# Patient Record
Sex: FEMALE | Race: WHITE
[De-identification: names, ages, dates, MRNs, and addresses within clinical notes are randomized per-mention and may not be internally consistent; named-entity substitution may affect disease eponyms.]

---

## 2017-06-06 ENCOUNTER — HOSPITAL ENCOUNTER (EMERGENCY)
Dept: HOSPITAL 62 - ER | Age: 37
Discharge: HOME | End: 2017-06-06
Payer: SELF-PAY

## 2017-06-06 VITALS — SYSTOLIC BLOOD PRESSURE: 131 MMHG | DIASTOLIC BLOOD PRESSURE: 83 MMHG

## 2017-06-06 DIAGNOSIS — R51: ICD-10-CM

## 2017-06-06 DIAGNOSIS — N39.0: Primary | ICD-10-CM

## 2017-06-06 DIAGNOSIS — F17.200: ICD-10-CM

## 2017-06-06 DIAGNOSIS — R10.2: ICD-10-CM

## 2017-06-06 DIAGNOSIS — R11.0: ICD-10-CM

## 2017-06-06 DIAGNOSIS — R50.9: ICD-10-CM

## 2017-06-06 LAB
APPEARANCE UR: (no result)
BILIRUB UR QL STRIP: NEGATIVE
GLUCOSE UR STRIP-MCNC: NEGATIVE MG/DL
KETONES UR STRIP-MCNC: NEGATIVE MG/DL
NITRITE UR QL STRIP: POSITIVE
PH UR STRIP: 6 [PH] (ref 5–9)
PROT UR STRIP-MCNC: 30 MG/DL
SP GR UR STRIP: 1.01
UROBILINOGEN UR-MCNC: 2 MG/DL (ref ?–2)

## 2017-06-06 PROCEDURE — 81001 URINALYSIS AUTO W/SCOPE: CPT

## 2017-06-06 PROCEDURE — 99283 EMERGENCY DEPT VISIT LOW MDM: CPT

## 2017-06-06 PROCEDURE — 87880 STREP A ASSAY W/OPTIC: CPT

## 2017-06-06 PROCEDURE — 81025 URINE PREGNANCY TEST: CPT

## 2017-06-06 PROCEDURE — S0119 ONDANSETRON 4 MG: HCPCS

## 2017-06-06 PROCEDURE — 87804 INFLUENZA ASSAY W/OPTIC: CPT

## 2017-06-06 PROCEDURE — 87070 CULTURE OTHR SPECIMN AEROBIC: CPT

## 2017-06-06 NOTE — ER DOCUMENT REPORT
Addendum entered and electronically signed by SHU HERRON PA-C  17 11:53

: 








Course





- Re-evaluation


Re-evalutation: 





17 11:52


[facility] called with a question about the Rx of Cipro for a UTI that 

was prescribed yesterday.  The script was written for 3 days, but there were 

directions for 5 days.  Approved change to a 5 day prescription of Cipro 500mg 

PO BID #[id number].  





- Vital Signs


Vital signs: 


 











Temp Pulse Resp BP Pulse Ox


 


 97.4 F   81   17   131/83 H  98 


 


 17 22:28  17 22:28  17 22:28  17 20:26  17 22:38














- Laboratory


Laboratory results interpreted by me: 


 











  17





  20:58


 


Urine Protein  30 H


 


Urine Nitrite  POSITIVE H


 


Urine Urobilinogen  2.0 H


 


Ur Leukocyte Esterase  SMALL H


 


Urine Ascorbic Acid  20 H














Original Note:








HPI





- HPI


Patient complains to provider of: pelvic pain


Onset: Other - 


Onset/Duration: Sudden


Quality of pain: Achy


Pain Level: 2


Context: 


currently on her period, no vaginal d/c


Associated Symptoms: Chills, Fever, Headache, Nausea.  denies: Diarrhea


Exacerbated by: Denies


Relieved by: Denies


Similar symptoms previously: No


Recently seen / treated by doctor: No





- CARDIOVASCULAR


Cardiovascular: DENIES: Chest pain





- REPRODUCTIVE


LMP: 17


Reproductive: DENIES: Pregnant:





- DERM


Skin Color: Normal, Pink





Past Medical History





- Social History


Smoking Status: Current Every Day Smoker


Chew tobacco use (# tins/day): No


Frequency of alcohol use: None


Drug Abuse: None


Family History: Reviewed & Not Pertinent


Patient has suicidal ideation: No


Patient has homicidal ideation: No


Renal/ Medical History: Denies: Hx Peritoneal Dialysis


Skin Medical History: Reports Hx MRSA


Past Surgical History: Reports: Hx  Section - x4





- Immunizations


Hx Diphtheria, Pertussis, Tetanus Vaccination: Yes





Vertical Provider Document





- CONSTITUTIONAL


Agree With Documented VS: Yes


Exam Limitations: No Limitations


General Appearance: WD/WN, No Apparent Distress





- INFECTION CONTROL


TRAVEL OUTSIDE OF THE [place] IN LAST 30 DAYS: No





- HEENT


HEENT: Atraumatic, Normal ENT Exam, Normocephalic, PERRLA


Notes: 


Uvula midline.  Airway patent. No evidence of tonsillar enlargement, 

peritonsillar abscess, retropharyngeal abscess.





- NECK


Neck: Normal Inspection.  negative: Lymphadenopathy-Left, Lymphadenopathy-Right





- RESPIRATORY


Respiratory: Breath Sounds Normal, No Respiratory Distress, Chest Non-Tender


O2 Sat by Pulse Oximetry: 98





- CARDIOVASCULAR


Cardiovascular: Regular Rate, Regular Rhythm, No Murmur


Pulses: Normal: Radial, Dorsalis pedis





- GI/ABDOMEN


Gastrointestinal: Abdomen Soft, Abdomen Non-Tender, No Organomegaly, Normal 

Bowel Sounds





- REPRODUCTIVE


Female Genitalia: Normal Inspection.  negative: CMT, Adnexal Pain-Right, 

Adnexal Pain-Left





- BACK


Back: Normal Inspection.  negative: CVA Tenderness-Right, CVA Tenderness-Left





- MUSCULOSKELETAL/EXTREMETIES


Musculoskeletal/Extremeties: MAEW, FROM, Non-Tender, No Edema.  negative: 

Eccymosis





- NEURO


Level of Consciousness: Awake, Alert, Appropriate


Motor/Sensory: No Motor Deficit, No Sensory Deficit





- DERM


Integumentary: Warm, Dry, No Rash





Course





- Re-evaluation


Re-evalutation: 





17 22:38


37-year-old female who is hemodynamic stable, no acute distress and afebrile.  

Urinalysis shows evidence of urinary tract infection.  Discharge patient home 

on p.o. antibiotics instruction to follow-up with primary care





- Vital Signs


Vital signs: 


 











Temp Pulse Resp BP Pulse Ox


 


 98.6 F   88   16   131/83 H  98 


 


 17 20:26  17 20:26  17 20:26  17 20:26  17 20:26














- Laboratory


Laboratory results interpreted by me: 


 











  17





  20:58


 


Urine Protein  30 H


 


Urine Nitrite  POSITIVE H


 


Urine Urobilinogen  2.0 H


 


Ur Leukocyte Esterase  SMALL H


 


Urine Ascorbic Acid  20 H














Discharge





- Discharge


Clinical Impression: 


 UTI (urinary tract infection)





Condition: Good


Disposition: HOME, SELF-CARE


Additional Instructions: 


URINARY TRACT INFECTION:





     Your evaluation indicates that you have a urinary tract infection. This is 

due to germs growing in the bladder.  This is a common problem.


     This infection usually responds quickly to antibiotics.  Your antibiotic 

should be taken exactly as prescribed.  Drink plenty of fluids -- three to four 

quarts a day.


     Occasionally, a bladder anesthetic will be prescribed to help stop the 

feeling of urgency until the antibiotic has a chance to clear the infection.  

This may cause your urine to be dark orange.


     Certain urine infections require a culture.  If the doctor obtained a 

culture, the results will be back in two days.  You should call to see if a 

change in treatment is needed.


     A repeat urinalysis after you finish treatment is often recommended.  The 

physician will let you know if further testing is required.


     Call the doctor if you develop fever, chills, flank pain, inability to 

urinate, or blood in the urine.








ANTIBIOTIC THERAPY:


     You have been given an antibiotic prescription.  It's important that you 

take all the medication, unless instructed otherwise by your physician.  

Failure to complete the entire course can result in relapse of your condition.


     Common side effects of antibiotics include nausea, intestinal cramping, or 

diarrhea.  Women may develop vaginal yeast infections, and babies can get yeast 

(thrush) in the mouth following the use of antibiotics.  Contact your physician 

if you develop significant side effects from this medication.


     Allergy to this antibiotic can result in hives, wheezing, faintness, or 

itching.  If symptoms of allergy occur, stop the medication and call the doctor.








CIPROFLOXACIN:


     You have been given an antibacterial agent, ciprofloxacin (Cipro).  This 

medicine is not related to the penicillins, sulfas, cephalosporins, or 

tetracyclines.  It is often given to patients who are allergic to these drugs.  

It has been chosen for you either because other drugs are not appropriate, or 

because of the nature of your problem.


     Cipro should not be taken with antacids, as these can decrease its 

effectiveness.  It can be taken without regard to meals.  CIPRO SHOULD NOT BE 

TAKEN BY CHILDREN, NURSING WOMEN, OR PREGNANT WOMEN.


     Although Cipro is usually well-tolerated, common side effects can include 

nausea and diarrhea.  Contact your doctor if you experience any unusual 

symptoms while on this medication, such as joint pain or swelling, shortness of 

breath, wheezing, faintness, or hives.








FOLLOW-UP CARE:


If you have been referred to a physician for follow-up care, call the physician

s office for an appointment as you were instructed or within the next two days.

  If you experience worsening or a significant change in your symptoms, notify 

the physician immediately or return to the Emergency Department at any time for 

re-evaluation.











Prescriptions: 


Ciprofloxacin HCl [Cipro 500 mg Tablet] 500 mg PO BID #6 tablet


Ondansetron [Zofran Odt 4 mg Tablet] 1 - 2 tab PO Q4H PRN #15 tab.rapdis


 PRN Reason: For Nausea/Vomiting


Forms:  Return to Work


Referrals: 


TESSA CASAS MD [ACTIVE STAFF] - Follow up as needed

## 2017-06-13 ENCOUNTER — HOSPITAL ENCOUNTER (EMERGENCY)
Dept: HOSPITAL 62 - ER | Age: 37
Discharge: HOME | End: 2017-06-13
Payer: SELF-PAY

## 2017-06-13 VITALS — DIASTOLIC BLOOD PRESSURE: 87 MMHG | SYSTOLIC BLOOD PRESSURE: 133 MMHG

## 2017-06-13 DIAGNOSIS — Z86.14: ICD-10-CM

## 2017-06-13 DIAGNOSIS — F17.210: ICD-10-CM

## 2017-06-13 DIAGNOSIS — Z87.440: ICD-10-CM

## 2017-06-13 DIAGNOSIS — R10.9: Primary | ICD-10-CM

## 2017-06-13 LAB
ALBUMIN SERPL-MCNC: 3.5 G/DL (ref 3.5–5)
ALP SERPL-CCNC: 74 U/L (ref 38–126)
ALT SERPL-CCNC: 27 U/L (ref 9–52)
ANION GAP SERPL CALC-SCNC: 11 MMOL/L (ref 5–19)
APPEARANCE UR: (no result)
AST SERPL-CCNC: 22 U/L (ref 14–36)
BASOPHILS # BLD AUTO: 0 10^3/UL (ref 0–0.2)
BASOPHILS NFR BLD AUTO: 0.5 % (ref 0–2)
BILIRUB DIRECT SERPL-MCNC: 0.2 MG/DL (ref 0–0.4)
BILIRUB SERPL-MCNC: 0.3 MG/DL (ref 0.2–1.3)
BILIRUB UR QL STRIP: NEGATIVE
BUN SERPL-MCNC: 11 MG/DL (ref 7–20)
CALCIUM: 9.2 MG/DL (ref 8.4–10.2)
CHLORIDE SERPL-SCNC: 106 MMOL/L (ref 98–107)
CO2 SERPL-SCNC: 24 MMOL/L (ref 22–30)
CREAT SERPL-MCNC: 0.79 MG/DL (ref 0.52–1.25)
EOSINOPHIL # BLD AUTO: 0.1 10^3/UL (ref 0–0.6)
EOSINOPHIL NFR BLD AUTO: 1.5 % (ref 0–6)
ERYTHROCYTE [DISTWIDTH] IN BLOOD BY AUTOMATED COUNT: 17.7 % (ref 11.5–14)
GLUCOSE SERPL-MCNC: 95 MG/DL (ref 75–110)
GLUCOSE UR STRIP-MCNC: NEGATIVE MG/DL
HCT VFR BLD CALC: 34.1 % (ref 36–47)
HGB BLD-MCNC: 11 G/DL (ref 12–15.5)
HGB HCT DIFFERENCE: -1.1
KETONES UR STRIP-MCNC: NEGATIVE MG/DL
LYMPHOCYTES # BLD AUTO: 1.4 10^3/UL (ref 0.5–4.7)
LYMPHOCYTES NFR BLD AUTO: 19.7 % (ref 13–45)
MCH RBC QN AUTO: 27.3 PG (ref 27–33.4)
MCHC RBC AUTO-ENTMCNC: 32.3 G/DL (ref 32–36)
MCV RBC AUTO: 85 FL (ref 80–97)
MONOCYTES # BLD AUTO: 0.4 10^3/UL (ref 0.1–1.4)
MONOCYTES NFR BLD AUTO: 6.2 % (ref 3–13)
NEUTROPHILS # BLD AUTO: 5.2 10^3/UL (ref 1.7–8.2)
NEUTS SEG NFR BLD AUTO: 72.1 % (ref 42–78)
NITRITE UR QL STRIP: NEGATIVE
PH UR STRIP: 7 [PH] (ref 5–9)
POTASSIUM SERPL-SCNC: 4.4 MMOL/L (ref 3.6–5)
PROT SERPL-MCNC: 6.6 G/DL (ref 6.3–8.2)
PROT UR STRIP-MCNC: NEGATIVE MG/DL
RBC # BLD AUTO: 4.03 10^6/UL (ref 3.72–5.28)
SODIUM SERPL-SCNC: 140.7 MMOL/L (ref 137–145)
SP GR UR STRIP: 1.01
UROBILINOGEN UR-MCNC: NEGATIVE MG/DL (ref ?–2)
WBC # BLD AUTO: 7.2 10^3/UL (ref 4–10.5)

## 2017-06-13 PROCEDURE — 87086 URINE CULTURE/COLONY COUNT: CPT

## 2017-06-13 PROCEDURE — 81025 URINE PREGNANCY TEST: CPT

## 2017-06-13 PROCEDURE — 81001 URINALYSIS AUTO W/SCOPE: CPT

## 2017-06-13 PROCEDURE — 99284 EMERGENCY DEPT VISIT MOD MDM: CPT

## 2017-06-13 PROCEDURE — 80053 COMPREHEN METABOLIC PANEL: CPT

## 2017-06-13 PROCEDURE — 85025 COMPLETE CBC W/AUTO DIFF WBC: CPT

## 2017-06-13 PROCEDURE — 36415 COLL VENOUS BLD VENIPUNCTURE: CPT

## 2017-06-13 NOTE — ER DOCUMENT REPORT
ED General





- General


Chief Complaint: Flank Pain


Stated Complaint: LEFT SIDE FLANK PAIN


Time Seen by Provider: 17 06:48


Mode of Arrival: Ambulatory


Information source: Patient


Notes: 


A 7-year-old female who is diagnosed as a UTI approximately 1 week ago who has 

been on Cipro for 5 days presents with complaints of left flank pain.  Patient 

notes she had a fever then but the fever has since resolved.  Patient admits to 

some burning on urination


TRAVEL OUTSIDE OF THE [place] IN LAST 30 DAYS: No





- HPI


Onset: Last week


Onset/Duration: Persistent


Quality of pain: Achy


Severity: Mild


Pain Level: 1


Associated symptoms: Other


Exacerbated by: Other - Urination


Relieved by: Denies


Similar symptoms previously: Yes


Recently seen / treated by doctor: Yes





- Related Data


Allergies/Adverse Reactions: 


 





No Known Allergies Allergy (Unverified 17 06:48)


 











Past Medical History





- Social History


Smoking Status: Current Every Day Smoker


Cigarette use (# per day): Yes


Chew tobacco use (# tins/day): No


Smoking Education Provided: No


Family History: Reviewed & Not Pertinent


Renal/ Medical History: Denies: Hx Peritoneal Dialysis


Skin Medical History: Reports Hx MRSA


Past Surgical History: Reports: Hx  Section - x4





- Immunizations


Hx Diphtheria, Pertussis, Tetanus Vaccination: Yes





Review of Systems





- Review of Systems


Notes: 


REVIEW OF SYSTEMS:


CONSTITUTIONAL :  Denies fever,  chills, or sweats.  Denies recent illness.


EENT:   Denies eye, ear, throat, or mouth pain or symptoms.  Denies nasal or 

sinus congestion or discharge.  Denies throat, tongue, or mouth swelling or 

difficulty swallowing.


CARDIOVASCULAR:  Denies chest pain.  Denies palpitations or racing or irregular 

heart beat.  Denies ankle edema.


RESPIRATORY:  Denies cough, cold, or chest congestion.  Denies shortness of 

breath, difficulty breathing, or wheezing.


GASTROINTESTINAL:  Denies abdominal pain or distention.  Denies nausea, vomiting

, or diarrhea.  Denies blood in vomitus, stools, or per rectum.  Denies black, 

tarry stools.  Denies constipation. 


GENITOURINARY: burning on urination 


FEMALE  GENITOURINARY:  Denies vaginal bleeding, heavy or abnormal periods, 

irregular periods.  Denies vaginal discharge or odor. 


MUSCULOSKELETAL:  left flank tenderness 


SKIN:   Denies rash, lesions or sores.


HEMATOLOGIC :   Denies easy bruising or bleeding.


LYMPHATIC:  Denies swollen, enlarged glands.


NEUROLOGICAL:  Denies confusion or altered mental status.  Denies passing out 

or loss of consciousness.  Denies dizziness or lightheadedness.  Denies 

headache.  Denies weakness or paralysis or loss of use of either side.  Denies 

problems with gait or speech.  Denies sensory loss, numbness, or tingling.  

Denies seizures.








PHYSICAL EXAMINATION:





GENERAL: Well-appearing, well-nourished and in no acute distress.





HEAD: Atraumatic, normocephalic.





EYES: Pupils equal round and reactive to light, extraocular movements intact, 

conjunctiva are normal.





ENT: Nares patent, oropharynx clear without exudates.  Moist mucous membranes.





NECK: Normal range of motion, supple without lymphadenopathy





LUNGS: Breath sounds clear to auscultation bilaterally and equal.  No wheezes 

rales or rhonchi.





HEART: Regular rate and rhythm without murmurs





ABDOMEN: Soft, nontender, nondistended abdomen.  No guarding, no rebound.  No 

masses appreciated. left cva tenderness 





Female : deferred





Musculoskeletal: Normal range of motion, no pitting or edema.  No cyanosis.





NEUROLOGICAL: Cranial nerves grossly intact.  Normal speech, normal gait.  

Normal sensory, motor exams





PSYCH: Normal mood, normal affect.





SKIN: Warm, Dry, normal turgor, no rashes or lesions noted.





PSYCHIATRIC:  Denies anxiety or stress.  Denies depression, suicidal ideation, 

or homicidal ideation.





ALL OTHER SYSTEMS REVIEWED AND NEGATIVE.








Dictation was performed using Dragon voice recognition software





Physical Exam





- Vital signs


Vitals: 


 











Temp Pulse Resp BP Pulse Ox


 


 97.6 F   68   18   144/84 H  100 


 


 17 06:48  17 06:48  17 06:48  17 06:48  17 06:48














Course





- Re-evaluation


Re-evalutation: 


17 07:13


Lab work urinalysis pending patient was probably treated with Cipro 

appropriately which resolved her fevers however did not completely resolve her 

symptoms





17 08:20


Patient's lab work notes significant improvement.  I do not believe she has 

infection still, urine culture has been ordered but given the patient is 

afebrile and labwork notes significant improvement I believe she is stable for 

discharge with pain control and a work excuse








Very strict return precautions provided to the patient








After performing a Medical Screening Examination, I estimate there is LOW risk 

for ACUTE APPENDICITIS, BOWEL OBSTRUCTION, ACUTE CHOLECYSTITIS, PERFORATED 

DIVERTICULITIS, INCARCERATED HERNIA, PANCREATITIS, PELVIC INFLAMMATORY DISEASE, 

PERFORATED ULCER, ECTOPIC PREGNANCY, or TUBO-OVARIAN ABSCESS, thus I consider 

the discharge disposition reasonable. Also, there is no evidence or peritonitis

, sepsis, or toxicity. I have reevaluated this patient multiple times and no 

significant life threatening changes are noted. The patient and I have 

discussed the diagnosis and risks, and we agree with discharging home with 

close follow-up with the understanding that symptoms and presentations can 

change. We also discussed returning to the Emergency Department immediately if 

new or worsening symptoms occur. We have discussed the symptoms which are most 

concerning (e.g., bloody stool, fever, changing or worsening pain, vomiting) 

that necessitate immediate return.





- Vital Signs


Vital signs: 


 











Temp Pulse Resp BP Pulse Ox


 


 97.6 F   68   18   144/84 H  100 


 


 17 06:48  17 06:48  17 06:48  17 06:48  17 06:48














- Laboratory


Result Diagrams: 


 17 07:14





 17 07:14


Laboratory results interpreted by me: 


 











  17





  06:53 07:14


 


Hgb   11.0 L


 


Hct   34.1 L


 


RDW   17.7 H


 


Urine Blood  SMALL H 


 


Ur Leukocyte Esterase  TRACE H 














Discharge





- Discharge


Clinical Impression: 


 Left flank pain, History of UTI





Condition: Stable


Disposition: HOME, SELF-CARE


Instructions:  Abdominal Pain ([facility])


Additional Instructions: 


Follow up with your physician tomorrow for further care or return to the ED 

IMMEDIATELY if symptoms worsen or new concerns occur. If you cannot afford to 

follow up with your primary care physician a list of low cost clinics have been 

provided at the end of your discharge papers as well.


Prescriptions: 


Hydrocodone/Acetaminophen [Norco 5-325 mg Tablet] 1 tab PO Q6 #10 tablet


Forms:  Return to Work

## 2020-01-15 ENCOUNTER — HOSPITAL ENCOUNTER (EMERGENCY)
Dept: HOSPITAL 62 - ER | Age: 40
Discharge: LEFT BEFORE DISCHARGE | End: 2020-01-15
Payer: SELF-PAY

## 2020-01-15 VITALS — SYSTOLIC BLOOD PRESSURE: 128 MMHG | DIASTOLIC BLOOD PRESSURE: 75 MMHG

## 2020-01-15 DIAGNOSIS — Z53.21: Primary | ICD-10-CM

## 2020-01-15 PROCEDURE — 93005 ELECTROCARDIOGRAM TRACING: CPT

## 2020-01-15 PROCEDURE — 93010 ELECTROCARDIOGRAM REPORT: CPT

## 2020-08-28 ENCOUNTER — HOSPITAL ENCOUNTER (EMERGENCY)
Dept: HOSPITAL 62 - ER | Age: 40
LOS: 1 days | Discharge: LEFT BEFORE DISCHARGE | End: 2020-08-29
Payer: SELF-PAY

## 2020-08-28 VITALS — SYSTOLIC BLOOD PRESSURE: 134 MMHG | DIASTOLIC BLOOD PRESSURE: 99 MMHG

## 2020-08-28 DIAGNOSIS — T19.2XXA: Primary | ICD-10-CM

## 2020-08-28 DIAGNOSIS — F17.210: ICD-10-CM
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
## 2020-08-28 DIAGNOSIS — Z53.20: ICD-10-CM

## 2020-08-28 DIAGNOSIS — R10.2: ICD-10-CM

## 2020-08-28 LAB
ADD MANUAL DIFF: NO
ALBUMIN SERPL-MCNC: 3.8 G/DL (ref 3.5–5)
ALP SERPL-CCNC: 97 U/L (ref 38–126)
ANION GAP SERPL CALC-SCNC: 10 MMOL/L (ref 5–19)
AST SERPL-CCNC: 17 U/L (ref 14–36)
BASOPHILS # BLD AUTO: 0 10^3/UL (ref 0–0.2)
BASOPHILS NFR BLD AUTO: 0.4 % (ref 0–2)
BILIRUB DIRECT SERPL-MCNC: 0.2 MG/DL (ref 0–0.4)
BILIRUB SERPL-MCNC: 0.6 MG/DL (ref 0.2–1.3)
BUN SERPL-MCNC: 7 MG/DL (ref 7–20)
CALCIUM: 9.5 MG/DL (ref 8.4–10.2)
CHLORIDE SERPL-SCNC: 102 MMOL/L (ref 98–107)
CO2 SERPL-SCNC: 26 MMOL/L (ref 22–30)
EOSINOPHIL # BLD AUTO: 0.1 10^3/UL (ref 0–0.6)
EOSINOPHIL NFR BLD AUTO: 1.3 % (ref 0–6)
ERYTHROCYTE [DISTWIDTH] IN BLOOD BY AUTOMATED COUNT: 20.3 % (ref 11.5–14)
ETHANOL SERPL-MCNC: < 10 MG/DL
GLUCOSE SERPL-MCNC: 112 MG/DL (ref 75–110)
HCT VFR BLD CALC: 29.4 % (ref 36–47)
HGB BLD-MCNC: 9.8 G/DL (ref 12–15.5)
LYMPHOCYTES # BLD AUTO: 1.1 10^3/UL (ref 0.5–4.7)
LYMPHOCYTES NFR BLD AUTO: 13.6 % (ref 13–45)
MCH RBC QN AUTO: 24.3 PG (ref 27–33.4)
MCHC RBC AUTO-ENTMCNC: 33.3 G/DL (ref 32–36)
MCV RBC AUTO: 73 FL (ref 80–97)
MONOCYTES # BLD AUTO: 0.7 10^3/UL (ref 0.1–1.4)
MONOCYTES NFR BLD AUTO: 8.4 % (ref 3–13)
NEUTROPHILS # BLD AUTO: 6.4 10^3/UL (ref 1.7–8.2)
NEUTS SEG NFR BLD AUTO: 76.3 % (ref 42–78)
PLATELET # BLD: 300 10^3/UL (ref 150–450)
POTASSIUM SERPL-SCNC: 3.9 MMOL/L (ref 3.6–5)
PROT SERPL-MCNC: 6.7 G/DL (ref 6.3–8.2)
RBC # BLD AUTO: 4.03 10^6/UL (ref 3.72–5.28)
TOTAL CELLS COUNTED % (AUTO): 100 %
WBC # BLD AUTO: 8.4 10^3/UL (ref 4–10.5)

## 2020-08-28 PROCEDURE — 96360 HYDRATION IV INFUSION INIT: CPT

## 2020-08-28 PROCEDURE — 76856 US EXAM PELVIC COMPLETE: CPT

## 2020-08-28 PROCEDURE — 36415 COLL VENOUS BLD VENIPUNCTURE: CPT

## 2020-08-28 PROCEDURE — 93976 VASCULAR STUDY: CPT

## 2020-08-28 PROCEDURE — 80307 DRUG TEST PRSMV CHEM ANLYZR: CPT

## 2020-08-28 PROCEDURE — 80053 COMPREHEN METABOLIC PANEL: CPT

## 2020-08-28 PROCEDURE — 99285 EMERGENCY DEPT VISIT HI MDM: CPT

## 2020-08-28 PROCEDURE — 84703 CHORIONIC GONADOTROPIN ASSAY: CPT

## 2020-08-28 PROCEDURE — 85025 COMPLETE CBC W/AUTO DIFF WBC: CPT

## 2020-08-28 NOTE — RADIOLOGY REPORT (SQ)
CLINICAL INDICATION: lower pelvic pain. .



TECHNIQUE: Real time multiplanar ultrasonographic gray scale

imaging was obtained of the pelvis. Imaging obtained

transabdominal.  32  images obtained.



COMPARISON: None.



CORRELATION: None.



FINDINGS: 



The uterus is of normal size contour and echotexture. It measures

8.2 x 4.5 x 6.2 centimeters. It is of homogeneous echotexture. 

Endometrial stripe complex is 1.8 cm. .



Ovaries are not visualized.



No significant  free fluid within the pelvis..



IMPRESSION:

Unremarkable examination. Uterus and cervix appear grossly

normal. The ovaries are not seen. No obvious free fluid.

## 2020-08-28 NOTE — ER DOCUMENT REPORT
ED Medical Screen (RME)





- General


Chief Complaint: Abdominal Pain


Stated Complaint: LEFT LOWER LEFT QUADRANT ABDOMINAL PAIN,CRAMPING


Time Seen by Provider: 20 19:57


Mode of Arrival: Ambulatory


Information source: Patient


Notes: 





Patient states that she accidentally had left a tampon in 4 days ago and did not

realize it until after having intercourse.  Patient states that she attempted to

remove the tampon but could not until the next day.  Patient states that she has

had left lower pelvic pain for the past 4 days.  Patient reports vaginal 

discharge.  Patient denies any urinary symptoms.  Patient denies any fever.





I have greeted and performed a rapid initial assessment of this patient.  A 

comprehensive ED assessment and evaluation of the patient, analysis of test 

results and completion of the medical decision making process will be conducted 

by additional ED providers.


TRAVEL OUTSIDE OF THE [place] IN LAST 30 DAYS: No





- Related Data


Allergies/Adverse Reactions: 


                                        





No Known Allergies Allergy (Unverified 17 06:48)


   











Past Medical History


Renal/ Medical History: Denies: Hx Peritoneal Dialysis


Skin Medical History: Reports Hx MRSA


Past Surgical History: Reports: Hx  Section - x4





- Immunizations


Hx Diphtheria, Pertussis, Tetanus Vaccination: Yes





Physical Exam





- General


Notes: 





Patient speech very thick, at times difficult to understand





- Abdominal


Tenderness: Tender - Lower pelvic tenderness

## 2020-08-29 ENCOUNTER — HOSPITAL ENCOUNTER (EMERGENCY)
Dept: HOSPITAL 62 - ER | Age: 40
Discharge: HOME | End: 2020-08-29
Payer: SELF-PAY

## 2020-08-29 VITALS — DIASTOLIC BLOOD PRESSURE: 84 MMHG | SYSTOLIC BLOOD PRESSURE: 153 MMHG

## 2020-08-29 DIAGNOSIS — R63.0: ICD-10-CM

## 2020-08-29 DIAGNOSIS — F17.210: ICD-10-CM

## 2020-08-29 DIAGNOSIS — R10.32: ICD-10-CM

## 2020-08-29 DIAGNOSIS — A54.9: ICD-10-CM

## 2020-08-29 DIAGNOSIS — R11.0: ICD-10-CM

## 2020-08-29 DIAGNOSIS — R10.2: ICD-10-CM

## 2020-08-29 DIAGNOSIS — N73.9: Primary | ICD-10-CM

## 2020-08-29 LAB
ADD MANUAL DIFF: NO
ALBUMIN SERPL-MCNC: 3.7 G/DL (ref 3.5–5)
ALP SERPL-CCNC: 106 U/L (ref 38–126)
ANION GAP SERPL CALC-SCNC: 9 MMOL/L (ref 5–19)
APPEARANCE UR: (no result)
APTT PPP: YELLOW S
AST SERPL-CCNC: 14 U/L (ref 14–36)
BACTERIA (WET MOUNT): (no result)
BARBITURATES UR QL SCN: NEGATIVE
BASOPHILS # BLD AUTO: 0.1 10^3/UL (ref 0–0.2)
BASOPHILS NFR BLD AUTO: 0.4 % (ref 0–2)
BILIRUB DIRECT SERPL-MCNC: 0.2 MG/DL (ref 0–0.4)
BILIRUB SERPL-MCNC: 0.7 MG/DL (ref 0.2–1.3)
BILIRUB UR QL STRIP: NEGATIVE
BUN SERPL-MCNC: 5 MG/DL (ref 7–20)
CALCIUM: 9.3 MG/DL (ref 8.4–10.2)
CHLAM PCR: NOT DETECTED
CHLORIDE SERPL-SCNC: 103 MMOL/L (ref 98–107)
CO2 SERPL-SCNC: 22 MMOL/L (ref 22–30)
EOSINOPHIL # BLD AUTO: 0.1 10^3/UL (ref 0–0.6)
EOSINOPHIL NFR BLD AUTO: 0.7 % (ref 0–6)
ERYTHROCYTE [DISTWIDTH] IN BLOOD BY AUTOMATED COUNT: 20.9 % (ref 11.5–14)
GLUCOSE SERPL-MCNC: 117 MG/DL (ref 75–110)
GLUCOSE UR STRIP-MCNC: NEGATIVE MG/DL
HCT VFR BLD CALC: 32 % (ref 36–47)
HGB BLD-MCNC: 10.4 G/DL (ref 12–15.5)
KETONES UR STRIP-MCNC: NEGATIVE MG/DL
LYMPHOCYTES # BLD AUTO: 0.9 10^3/UL (ref 0.5–4.7)
LYMPHOCYTES NFR BLD AUTO: 7.2 % (ref 13–45)
MCH RBC QN AUTO: 23.8 PG (ref 27–33.4)
MCHC RBC AUTO-ENTMCNC: 32.4 G/DL (ref 32–36)
MCV RBC AUTO: 74 FL (ref 80–97)
METHADONE UR QL SCN: NEGATIVE
MONOCYTES # BLD AUTO: 1 10^3/UL (ref 0.1–1.4)
MONOCYTES NFR BLD AUTO: 8.3 % (ref 3–13)
NEUTROPHILS # BLD AUTO: 10.5 10^3/UL (ref 1.7–8.2)
NEUTS SEG NFR BLD AUTO: 83.4 % (ref 42–78)
NITRITE UR QL STRIP: NEGATIVE
PCP UR QL SCN: NEGATIVE
PH UR STRIP: 6 [PH] (ref 5–9)
PLATELET # BLD: 350 10^3/UL (ref 150–450)
POTASSIUM SERPL-SCNC: 3.9 MMOL/L (ref 3.6–5)
PROT SERPL-MCNC: 6.7 G/DL (ref 6.3–8.2)
PROT UR STRIP-MCNC: NEGATIVE MG/DL
RBC # BLD AUTO: 4.34 10^6/UL (ref 3.72–5.28)
SP GR UR STRIP: 1.01
T.VAGINALIS (WET MOUNT): (no result)
TOTAL CELLS COUNTED % (AUTO): 100 %
URINE BENZODIAZEPINES SCREEN: NEGATIVE
URINE COCAINE SCREEN: NEGATIVE
URINE MARIJUANA (THC) SCREEN: (no result)
UROBILINOGEN UR-MCNC: 2 MG/DL (ref ?–2)
WBC # BLD AUTO: 12.6 10^3/UL (ref 4–10.5)
WBCS (WET MOUNT): (no result)
YEAST (WET MOUNT): (no result)

## 2020-08-29 PROCEDURE — 96375 TX/PRO/DX INJ NEW DRUG ADDON: CPT

## 2020-08-29 PROCEDURE — 81001 URINALYSIS AUTO W/SCOPE: CPT

## 2020-08-29 PROCEDURE — 85025 COMPLETE CBC W/AUTO DIFF WBC: CPT

## 2020-08-29 PROCEDURE — 80053 COMPREHEN METABOLIC PANEL: CPT

## 2020-08-29 PROCEDURE — 96365 THER/PROPH/DIAG IV INF INIT: CPT

## 2020-08-29 PROCEDURE — 87088 URINE BACTERIA CULTURE: CPT

## 2020-08-29 PROCEDURE — 87086 URINE CULTURE/COLONY COUNT: CPT

## 2020-08-29 PROCEDURE — 99284 EMERGENCY DEPT VISIT MOD MDM: CPT

## 2020-08-29 PROCEDURE — 99406 BEHAV CHNG SMOKING 3-10 MIN: CPT

## 2020-08-29 PROCEDURE — 87591 N.GONORRHOEAE DNA AMP PROB: CPT

## 2020-08-29 PROCEDURE — 83690 ASSAY OF LIPASE: CPT

## 2020-08-29 PROCEDURE — 36415 COLL VENOUS BLD VENIPUNCTURE: CPT

## 2020-08-29 PROCEDURE — 87210 SMEAR WET MOUNT SALINE/INK: CPT

## 2020-08-29 PROCEDURE — 80307 DRUG TEST PRSMV CHEM ANLYZR: CPT

## 2020-08-29 PROCEDURE — 87186 SC STD MICRODIL/AGAR DIL: CPT

## 2020-08-29 PROCEDURE — 87491 CHLMYD TRACH DNA AMP PROBE: CPT

## 2020-08-29 NOTE — ER DOCUMENT REPORT
ED GI/





- General


Chief Complaint: Abdominal Pain


Stated Complaint: VAGINAL PAIN


Time Seen by Provider: 20 14:31


Primary Care Provider: 


TESSA CASAS MD [ACTIVE STAFF] - 20


Mode of Arrival: Wheelchair


Information source: Patient


Notes: 





40-year-old female presented to ED for complaint of lower abdominal pain worse 

on the left lower side.  She states she has no appetite and will not eat because

it is too painful.  She states she does have nausea at times due to the pain she

does not have any fever.  She states the pain started 5 days ago.  She states 

she came in here yesterday and laid on the floor for 5 hours and never got any 

treatment so she got up and left and went home








Constitutional: Negative for fever.


HENT: Negative for sore throat.


Eyes: Negative for visual changes.


Cardiovascular: Negative for chest pain.


Respiratory: Negative for shortness of breath.


Gastrointestinal: Complains of severe lower left abdomen pain with nausea


Genitourinary: Complains of pelvic pain and thinks she is got toxic shock 

syndrome but no fever


Musculoskeletal: Negative for back pain.


Skin: Negative for rash.


Neurological: Negative for headaches, weakness or numbness.





10 point ROS negative except as marked above and in HPI.





PHYSICAL EXAMINATION:





GENERAL: Well-appearing, well-nourished and in no acute distress.





HEAD: Atraumatic, normocephalic.





EYES: Pupils equal round and reactive to light, extraocular movements intact, 

sclera anicteric, conjunctiva are normal.





ENT: nares patent, oropharynx clear without exudates.  Moist mucous membranes.





NECK: Normal range of motion, supple without lymphadenopathy





LUNGS: Breath sounds clear to auscultation bilaterally and equal.  No wheezes 

rales or rhonchi.





HEART: Regular rate and rhythm without murmurs





ABDOMEN: Soft, nontender, normoactive bowel sounds.  No guarding, no rebound.  

No masses appreciated.





Genitourinary female: Extremely painful pelvic exam.  Adnexal tenderness with 

copious amounts of green vaginal discharge very foul-smelling





EXTREMITIES: Normal range of motion, no pitting or edema.  No cyanosis.





NEUROLOGICAL: No focal neurological deficits. Moves all extremities spontane

ously and on command.





PSYCH: Normal mood, normal affect.





SKIN: Warm, Dry, normal turgor, no rashes or lesions noted.


TRAVEL OUTSIDE OF THE [place] IN LAST 30 DAYS: No





- HPI


Patient complains to provider of: Abdominal pain, Other - No appetite


Onset: Other - 5 days ago


Timing/Duration: Intermittent


Quality of pain: Cramping, Sharp


Severity at maximum: Severe


Severity in ED: Severe


Pain Level: 5


Location: [facility]


Vaginal bleeding (Compared to normal period): None


Associated symptoms: Nausea


Exacerbated by: Sitting, Movement, Walking


Relieved by: Denies


Similar symptoms previously: Yes


Recently seen / treated by doctor: Yes





- Related Data


Allergies/Adverse Reactions: 


                                        





No Known Allergies Allergy (Unverified 17 06:48)


   











Past Medical History





- General


Information source: Patient





- Social History


Smoking Status: Current Every Day Smoker


Cigarette use (# per day): Yes - Pack per day


Smoking Education Provided: Yes - 3


Frequency of alcohol use: Occasional


Lives with: Family


Family History: Reviewed & Not Pertinent


Patient has suicidal ideation: No


Patient has homicidal ideation: No


Renal/ Medical History: Denies: Hx Peritoneal Dialysis


Skin Medical History: Reports Hx MRSA


Past Surgical History: Reports: Hx  Section - x4





- Immunizations


Hx Diphtheria, Pertussis, Tetanus Vaccination: Yes





Physical Exam





- Vital signs


Vitals: 


                                        











Temp Pulse Resp BP Pulse Ox


 


 97.9 F   104 H  20   132/103 H  100 


 


 20 14:23  20 14:23  20 14:23  20 14:23  20 14:23














Course





- Re-evaluation


Re-evalutation: 





20 01:08


Discussed labs and pelvic exam with patient.  Also discussed with Dr. Reid.  BRAD medley was treated with Rocephin 1 g IV azithromycin 1 g and doxycycline 100 mg 

and discharged home with prescription for doxycycline 100 mg twice daily.  She 

was instructed to follow-up with her primary care and/or OB/GYN.  She was 

informed that she had pelvic inflammatory disease with gonorrhea and that she 

needed to abstain from any sexual intercourse till she completed her medications

and her partner was also treated.  She states that it was a random hookup at a 

bar and she does not know the person.  I did give her a copy of the lab results 

and told her to please follow-up with an OB/GYN or return to the ED for any 

increase in symptoms.  Patient verbalized understanding and agreement with 

treatment plan and patient was discharged home.





- Vital Signs


Vital signs: 


                                        











Temp Pulse Resp BP Pulse Ox


 


 98.3 F   104 H  20   153/84 H  100 


 


 20 17:50  20 14:23  20 14:23  20 17:42  20 17:42














- Laboratory


Result Diagrams: 


                                 20 14:48





                                 20 14:48


Laboratory results interpreted by me: 


                                        











  20





  14:48 14:48 14:48


 


WBC  12.6 H  


 


Hgb  10.4 L  


 


Hct  32.0 L  


 


MCV  74 L  


 


MCH  23.8 L  


 


RDW  20.9 H  


 


Lymph % (Auto)  7.2 L  


 


Absolute Neuts (auto)  10.5 H  


 


Seg Neutrophils %  83.4 H  


 


Sodium   134.3 L 


 


BUN   5 L 


 


Creatinine   0.50 L 


 


Glucose   117 H 


 


Lipase   11.2 L 


 


Urine Urobilinogen    2.0 H


 


Ur Leukocyte Esterase    LARGE H


 


N.gonorrhoeae DNA (PCR)   














  20





  15:44


 


WBC 


 


Hgb 


 


Hct 


 


MCV 


 


MCH 


 


RDW 


 


Lymph % (Auto) 


 


Absolute Neuts (auto) 


 


Seg Neutrophils % 


 


Sodium 


 


BUN 


 


Creatinine 


 


Glucose 


 


Lipase 


 


Urine Urobilinogen 


 


Ur Leukocyte Esterase 


 


N.gonorrhoeae DNA (PCR)  DETECTED H














Discharge





- Discharge


Clinical Impression: 


 PID (acute pelvic inflammatory disease), Gonorrhea





Condition: Stable


Disposition: HOME, SELF-CARE


Additional Instructions: 


PELVIC PAIN:





     There are many causes of pain in the pelvic area.  The cause could be the 

tubes, ovaries, uterus, intestines, appendix, pelvic muscles and connective 

tissue, or the urinary tract.  The cause of your pelvic pain is not clear.  

However, it seems safe to treat you outside the hospital.


     If the pain sounds like a temporary problem, we sometimes wait to see if it

goes away.  Other patients may need additional tests, such as pelvic ultrasound 

or cultures.


     Conditions may change.  Call us or come back for reexamination if any 

problems occur, such as: 


(1) Pain that becomes more severe, steady, or becomes concentrated in one 

specific area.  Also, pain that is more severe with movement or coughing.  


(2) Vomiting that persists or becomes more frequent.  


(3) Blood in the vomitus, urine, or bowel movements. Blood in the stool may have

a tarry or black appearance. 


(4) Shaking chills or fever greater than 100 degrees.  


(5) The abdomen becomes more distended or swollen.  


(6) Bowel movements cease.  


(7) Heavy vaginal bleeding.








PELVIC INFLAMMATORY DISEASE:





     You have been diagnosed as having pelvic inflammatory disease (PID).  This 

is an infection of the fallopian tubes and surrounding areas of the pelvis.  Sym

ptoms are usually pelvic pain and discharge.  The infection can do permanent 

damage to the tubes and ovaries.  It should be taken very seriously.


     Treatment is antibiotics, which may be given by vein or by injection if the

infection seems serious.  It's important that you receive all recommended 

medication. Condoms help prevent spread of this infection to others.


     Because this infection is spread sexually, it's important that your sexual 

partner be checked before resuming sexual relations.  If a culture shows 

gonorrhea or chlamydia organisms, the law requires that this be reported to the 

health department.


     Call the doctor or return at once if you develop increasing fever, rash, 

severe pelvic pain, vaginal bleeding (other than your period), or problems with 

your bladder or bowels.








PAIN MEDICATION INJECTION:


     You have received an injection of a pain medication.  You should experience

significant pain relief within 45 minutes.  This drug is a narcotic -- it will 

impair your judgement, slow your reaction time and make you sleepy (as well as 

relieve your pain).  Narcotics also can cause nausea.


     You should not drive, work with machinery, or perform any task requiring 

mental alertness until all effects of the medication are gone -- six to eight 

hours.  Do not take any alcohol, or sedatives, and do not take any other 

medication without checking with your physician.








CEPHALOSPORINS:


     An antibiotic of the cephalosporin class has been prescribed. This type of 

antibiotic covers a wide variety of infections, including those of the skin, 

lungs, middle ear, and urinary tract.


     This antibiotic is somewhat similar to the penicillin family. In rare 

cases, a person who is allergic to penicillin will also be allergic to this 

medication.  If you have had a severe allergic reaction to penicillin, and have 

not taken this antibiotic since that time, notify your doctor.


     Antibiotics which cover many germs ("broad spectrum" antibiotics) are more 

likely to cause diarrhea or "yeast" infections.  Women prone to vaginal yeast 

problems may suffer an attack after taking this antibiotic.  In infants, oral 

thrush (white spots "stuck" on the cheek) or yeast diaper rash may result.  See 

your doctor if these problems occur.


     Call the doctor at once if you develop hives, itching, shortness of breath,

or lightheadedness.








DOXYCYCLINE:


     Doxycycline (Vibramycin, Doryx) is an antibiotic of the tetracycline 

family.  This type of drug is useful for infections of the respiratory tract and

genital tract, and is sometimes used for intestinal infections.


     Unlike most tetracyclines, doxycycline can be taken with food.  It is 

longer acting, and (usually) less prone to side effects than regular 

tetracycline.


     Tetracycline antibiotics can stain immature teeth and SHOULD NOT BE TAKEN 

BY CHILDREN, NURSING MOTHERS, OR PREGNANT WOMEN.


     Tetracyclines can make you more prone to sunburn.  Abdominal cramping, 

nausea, and diarrhea are occasional side effects.  Women may experience vaginal 

yeast infections.


     Call the doctor at once if you develop hives, itching, shortness of breath,

or lightheadedness.








AZITHROMYCIN:


     Azithromycin (Zithromax) is a broad spectrum antibiotic in the same class 

as erythromycin.  It can treat a variety of bacterial infections, but is most 

frequently used for respiratory infections.


     Azithromycin is extremely long-lasting.  It accumulates in body tissues and

continues to kill bacteria for many days.


     In order to improve absorption, Azithromycin should be taken at least one 

hour before or two hours after a meal.  It does not have the same strong 

tendency to upset the stomach as erythromycin and is usually very well 

tolerated.


     Patients who have had a rash or other true allergic reactions to 

erythromycin should not take this medication.  Call if you develop 

gastrointestinal distress, severe diarrhea, rash, hives, itching, or shortness 

of breath.





Acetaminophen





     Acetaminophen may be taken for pain relief or fever control. It's much 

safer than aspirin, offering a wider range of "safe" dosages.  It is safe during

pregnancy.  Some brand names are Tylenol, Panadol, Datril, Anacin 3, Tempra, and

Liquiprin. Acetaminophen can be repeated every four hours.  The following are 

maximum recommended dosages:





WEIGHT         Dose             Drops                  Elixir        

Chewable(80mg)


(LBS.)                            drprs=droppers    tsp=teaspoon


6                 40 mg            .4 ml (1/2)


6-11            80 mg            .8 ml (full)            1/2   tsp           1  

    tab


12-16         120 mg           1 1/2 drprs            3/4   tsp           1 1/2 

tabs


17-23         160 mg             2  drprs              1      tsp           2   

   tabs


24-30         240 mg             3  drprs              1 1/2 tsp           3    

  tabs


30-35         320 mg                                     2       tsp           4

      tabs


36-41         360 mg                                     2 1/4 tsp           4 

1/2  tabs


42-47         400 mg                                     2 1/2 tsp           5  

    tabs


48-53         480 mg                                     3       tsp          6 

     tabs


54-59         520 mg                                     3  1/4 tsp          6 

1/2 tabs


60-64         560 mg                                     3  1/2 tsp          7  

   tabs 


65-70         600 mg                                     3  3/4 tsp          7 

1/2 tabs


71-76         640 mg                                     4       tsp           8

     tabs


77-82         720 mg                                     4 1/2  tsp           9 

    tabs


83-88         800 mg                                     5       tsp           

10     tabs





>89 pounds or adults          650 mg to 900 mg 





Acetaminophen can be repeated every four hours. Maximum daily dose not to exceed

4000 mg.





   These maximum recommended dosages are slightly higher than the dosages 

written on the product container, but these dosages are very safe and well below

the toxic dosage for acetaminophen.








Ibuprofen





     Ibuprofen is an excellent, safe drug for pain control.  In addition, it has

potent antiinflammatory effects which are beneficial, especially in the 

treatment of injuries, arthritis, or tendonitis. It's best to take ibuprofen 

with food.  Persons with ulcer disease or allergy to aspirin should notify their

physician of this before taking ibuprofen.


     Take the medication exactly as prescribed.  Don't take additional doses 

unless instructed to do so by your doctor.  If you develop wheezing, shortness 

of breath, hives, faintness, stomach pain, vomiting, or dark black stools, 

return for re-evaluation at once.





FOLLOW-UP CARE:


If you have been referred to a physician for follow-up care, call the 

physicians office for an appointment as you were instructed or within the next 

two days.  If you experience worsening or a significant change in your symptoms,

notify the physician immediately or return to the Emergency Department at any 

time for re-evaluation.


Prescriptions: 


Doxycycline Monohydrate 100 mg PO BID #20 tablet


Forms:  Elevated Blood Pressure, Smoking Cessation Education, Return to Work


Referrals: 


TESSA CASAS MD [ACTIVE STAFF] - 20

## 2020-08-29 NOTE — ER DOCUMENT REPORT
ED GI/





- General


Chief Complaint: Abdominal Pain


Stated Complaint: LEFT LOWER LEFT QUADRANT ABDOMINAL PAIN,CRAMPING


Time Seen by Provider: 20 19:57


Mode of Arrival: Ambulatory


Information source: Patient


Notes: 





ED Medical Screen (Juan notes)





- General


Chief Complaint: Abdominal Pain


Stated Complaint: LEFT LOWER LEFT QUADRANT ABDOMINAL PAIN,CRAMPING


Time Seen by Provider: 20 19:57


Mode of Arrival: Ambulatory


Information source: Patient


Notes: 





Patient states that she accidentally had left a tampon in 4 days ago and did not

realize it until after having intercourse.  Patient states that she attempted to

remove the tampon but could not until the next day.  Patient states that she has

had left lower pelvic pain for the past 4 days.  Patient reports vaginal 

discharge.  Patient denies any urinary symptoms.  Patient denies any fever.





My Notes


40-year-old female with above history.  Ultrasound within normal limits CBC with

9.8 hemoglobin and chemistries within normal limits.


I was unable to see this patient because she left AMA prior to my exam.


TRAVEL OUTSIDE OF THE [place] IN LAST 30 DAYS: No





- HPI


Patient complains to provider of: Flank pain, Vaginal discharge





- Related Data


Allergies/Adverse Reactions: 


                                        





No Known Allergies Allergy (Unverified 17 06:48)


   











Past Medical History





- General


Information source: Patient





- Social History


Smoking Status: Current Every Day Smoker


Cigarette use (# per day): Yes


Chew tobacco use (# tins/day): No


Smoking Education Provided: Yes


Frequency of alcohol use: None


Drug Abuse: None


Lives with: Family


Family History: Reviewed & Not Pertinent


Patient has suicidal ideation: No


Patient has homicidal ideation: No


Renal/ Medical History: Denies: Hx Peritoneal Dialysis


Skin Medical History: Reports Hx MRSA


Past Surgical History: Reports: Hx  Section - x4





- Immunizations


Hx Diphtheria, Pertussis, Tetanus Vaccination: Yes





Review of Systems





- Review of Systems


Constitutional: No symptoms reported


EENT: No symptoms reported


Cardiovascular: No symptoms reported


Respiratory: No symptoms reported


Gastrointestinal: No symptoms reported


Genitourinary: See HPI, Discharge


Female Genitourinary: No symptoms reported


Musculoskeletal: No symptoms reported


Skin: No symptoms reported


Hematologic/Lymphatic: No symptoms reported


Neurological/Psychological: No symptoms reported





Physical Exam





- Vital signs


Vitals: 


                                        











Temp Pulse Resp BP Pulse Ox


 


 99.4 F   100   18   134/99 H  100 


 


 20 19:55  20 19:55  20 19:55  20 19:55  20 19:55











Interpretation: Hypertensive





Course





- Vital Signs


Vital signs: 


                                        











Temp Pulse Resp BP Pulse Ox


 


 99.4 F   100   18   134/99 H  100 


 


 20 19:58  20 19:55  20 19:55  20 19:55  20 19:55














- Laboratory


Result Diagrams: 


                                 20 20:09





                                 20 20:09


Laboratory results interpreted by me: 


                                        











  20





  20:09 20:09


 


Hgb  9.8 L 


 


Hct  29.4 L 


 


MCV  73 L 


 


MCH  24.3 L 


 


RDW  20.3 H 


 


Glucose   112 H














Discharge





- Discharge


Clinical Impression: 


 vaginal foreign body tampon





Condition: Good


Disposition: AGAINST MEDICAL ADVICE